# Patient Record
Sex: FEMALE | Race: WHITE | Employment: FULL TIME | URBAN - METROPOLITAN AREA
[De-identification: names, ages, dates, MRNs, and addresses within clinical notes are randomized per-mention and may not be internally consistent; named-entity substitution may affect disease eponyms.]

---

## 2022-07-12 ENCOUNTER — TRANSCRIBE ORDER (OUTPATIENT)
Dept: REGISTRATION | Age: 37
End: 2022-07-12

## 2022-07-12 ENCOUNTER — HOSPITAL ENCOUNTER (OUTPATIENT)
Dept: GENERAL RADIOLOGY | Age: 37
Discharge: HOME | End: 2022-07-12
Attending: PODIATRIST
Payer: OTHER GOVERNMENT

## 2022-07-12 DIAGNOSIS — Q66.89 TARSAL COALITION OF BOTH FEET: ICD-10-CM

## 2022-07-12 DIAGNOSIS — Q66.89 TARSAL COALITION OF BOTH FEET: Primary | ICD-10-CM

## 2022-07-12 PROCEDURE — 73630 X-RAY EXAM OF FOOT: CPT

## 2022-07-25 ENCOUNTER — OFFICE VISIT (OUTPATIENT)
Dept: HEMATOLOGY | Age: 37
End: 2022-07-25
Payer: OTHER GOVERNMENT

## 2022-07-25 ENCOUNTER — HOSPITAL ENCOUNTER (OUTPATIENT)
Dept: LAB | Age: 37
Discharge: HOME | End: 2022-07-25
Payer: OTHER GOVERNMENT

## 2022-07-25 VITALS
TEMPERATURE: 96.9 F | BODY MASS INDEX: 42.17 KG/M2 | WEIGHT: 238 LBS | SYSTOLIC BLOOD PRESSURE: 140 MMHG | DIASTOLIC BLOOD PRESSURE: 91 MMHG | HEART RATE: 84 BPM | OXYGEN SATURATION: 99 % | HEIGHT: 63 IN

## 2022-07-25 DIAGNOSIS — K76.9 CHRONIC LIVER DISEASE: Primary | ICD-10-CM

## 2022-07-25 DIAGNOSIS — K76.9 CHRONIC LIVER DISEASE: ICD-10-CM

## 2022-07-25 PROBLEM — Z98.890 H/O BILATERAL BREAST REDUCTION SURGERY: Status: ACTIVE | Noted: 2022-07-25

## 2022-07-25 PROBLEM — I10 HTN (HYPERTENSION): Status: ACTIVE | Noted: 2022-07-25

## 2022-07-25 PROBLEM — M75.80: Status: ACTIVE | Noted: 2022-07-25

## 2022-07-25 LAB
ALBUMIN SERPL-MCNC: 3.7 G/DL (ref 3.4–5)
ALBUMIN/GLOB SERPL: 0.9 {RATIO} (ref 0.8–1.7)
ALP SERPL-CCNC: 59 U/L (ref 45–117)
ALT SERPL-CCNC: 24 U/L (ref 13–56)
AMMONIA PLAS-SCNC: 30 UMOL/L (ref 11–32)
ANION GAP SERPL CALC-SCNC: 6 MMOL/L (ref 3–18)
AST SERPL-CCNC: 13 U/L (ref 10–38)
BASOPHILS # BLD: 0.1 K/UL (ref 0–0.1)
BASOPHILS NFR BLD: 1 % (ref 0–2)
BILIRUB DIRECT SERPL-MCNC: <0.1 MG/DL (ref 0–0.2)
BILIRUB SERPL-MCNC: 0.2 MG/DL (ref 0.2–1)
BUN SERPL-MCNC: 20 MG/DL (ref 7–18)
BUN/CREAT SERPL: 27 (ref 12–20)
CALCIUM SERPL-MCNC: 9.2 MG/DL (ref 8.5–10.1)
CHLORIDE SERPL-SCNC: 104 MMOL/L (ref 100–111)
CO2 SERPL-SCNC: 27 MMOL/L (ref 21–32)
CREAT SERPL-MCNC: 0.74 MG/DL (ref 0.6–1.3)
DIFFERENTIAL METHOD BLD: ABNORMAL
EOSINOPHIL # BLD: 0.4 K/UL (ref 0–0.4)
EOSINOPHIL NFR BLD: 4 % (ref 0–5)
ERYTHROCYTE [DISTWIDTH] IN BLOOD BY AUTOMATED COUNT: 13.2 % (ref 11.6–14.5)
FERRITIN SERPL-MCNC: 36 NG/ML (ref 8–388)
GLOBULIN SER CALC-MCNC: 4.2 G/DL (ref 2–4)
GLUCOSE SERPL-MCNC: 95 MG/DL (ref 74–99)
HCT VFR BLD AUTO: 40.6 % (ref 35–45)
HGB BLD-MCNC: 13.1 G/DL (ref 12–16)
IMM GRANULOCYTES # BLD AUTO: 0.1 K/UL (ref 0–0.04)
IMM GRANULOCYTES NFR BLD AUTO: 1 % (ref 0–0.5)
INR PPP: 1 (ref 0.8–1.2)
IRON SATN MFR SERPL: 17 % (ref 20–50)
IRON SERPL-MCNC: 69 UG/DL (ref 50–175)
LYMPHOCYTES # BLD: 3 K/UL (ref 0.9–3.6)
LYMPHOCYTES NFR BLD: 32 % (ref 21–52)
MCH RBC QN AUTO: 28.4 PG (ref 24–34)
MCHC RBC AUTO-ENTMCNC: 32.3 G/DL (ref 31–37)
MCV RBC AUTO: 87.9 FL (ref 78–100)
MONOCYTES # BLD: 0.7 K/UL (ref 0.05–1.2)
MONOCYTES NFR BLD: 8 % (ref 3–10)
NEUTS SEG # BLD: 5.1 K/UL (ref 1.8–8)
NEUTS SEG NFR BLD: 54 % (ref 40–73)
NRBC # BLD: 0 K/UL (ref 0–0.01)
NRBC BLD-RTO: 0 PER 100 WBC
PLATELET # BLD AUTO: 311 K/UL (ref 135–420)
PMV BLD AUTO: 9.3 FL (ref 9.2–11.8)
POTASSIUM SERPL-SCNC: 4.3 MMOL/L (ref 3.5–5.5)
PROT SERPL-MCNC: 7.9 G/DL (ref 6.4–8.2)
PROTHROMBIN TIME: 13.3 SEC (ref 11.5–15.2)
RBC # BLD AUTO: 4.62 M/UL (ref 4.2–5.3)
SODIUM SERPL-SCNC: 137 MMOL/L (ref 136–145)
TIBC SERPL-MCNC: 400 UG/DL (ref 250–450)
WBC # BLD AUTO: 9.3 K/UL (ref 4.6–13.2)

## 2022-07-25 PROCEDURE — 86704 HEP B CORE ANTIBODY TOTAL: CPT

## 2022-07-25 PROCEDURE — 83540 ASSAY OF IRON: CPT

## 2022-07-25 PROCEDURE — 86706 HEP B SURFACE ANTIBODY: CPT

## 2022-07-25 PROCEDURE — 86037 ANCA TITER EACH ANTIBODY: CPT

## 2022-07-25 PROCEDURE — 80076 HEPATIC FUNCTION PANEL: CPT

## 2022-07-25 PROCEDURE — 86015 ACTIN ANTIBODY EACH: CPT

## 2022-07-25 PROCEDURE — 82107 ALPHA-FETOPROTEIN L3: CPT

## 2022-07-25 PROCEDURE — 82728 ASSAY OF FERRITIN: CPT

## 2022-07-25 PROCEDURE — 85610 PROTHROMBIN TIME: CPT

## 2022-07-25 PROCEDURE — 36415 COLL VENOUS BLD VENIPUNCTURE: CPT

## 2022-07-25 PROCEDURE — 85025 COMPLETE CBC W/AUTO DIFF WBC: CPT

## 2022-07-25 PROCEDURE — 86803 HEPATITIS C AB TEST: CPT

## 2022-07-25 PROCEDURE — 99204 OFFICE O/P NEW MOD 45 MIN: CPT | Performed by: NURSE PRACTITIONER

## 2022-07-25 PROCEDURE — 80048 BASIC METABOLIC PNL TOTAL CA: CPT

## 2022-07-25 PROCEDURE — 86708 HEPATITIS A ANTIBODY: CPT

## 2022-07-25 PROCEDURE — 82140 ASSAY OF AMMONIA: CPT

## 2022-07-25 PROCEDURE — 87340 HEPATITIS B SURFACE AG IA: CPT

## 2022-07-25 PROCEDURE — 86038 ANTINUCLEAR ANTIBODIES: CPT

## 2022-07-25 RX ORDER — IBUPROFEN 200 MG
200 TABLET ORAL
COMMUNITY

## 2022-07-25 RX ORDER — AMLODIPINE BESYLATE 5 MG/1
5 TABLET ORAL DAILY
COMMUNITY
Start: 2022-01-19 | End: 2022-07-25

## 2022-07-25 RX ORDER — AMLODIPINE AND VALSARTAN 5; 160 MG/1; MG/1
1 TABLET ORAL DAILY
COMMUNITY
Start: 2022-07-12

## 2022-07-25 NOTE — PROGRESS NOTES
Critical access hospital0 [facility], [place], [place], Tawnya Trinoemiliano [place], [place]      Andry Alvarado, MISAEL Ruiz, [facility]     April S Elif, [facility]   LUCY Garcia-CHRISTIANO Julien, [facility]       [place] [facility] [address]    at [address], [address] [facility], [facility] 22.    [phone]    FAX: [address] [place] [address], [address]    [phone]    FAX: [phone]       Patient Care Team:  Philip Domingo NP as PCP - General (Nurse Practitioner)  Larry SUMMER Harper (Podiatry)      Problem List  Date Reviewed: 7/25/2022            Codes Class Noted    AC (acromioclavicular) joint bone spurs, unspecified laterality ICD-10-CM: M75.80  ICD-9-CM: 726.91  7/25/2022        H/O bilateral breast reduction surgery ICD-10-CM: Z98.890  ICD-9-CM: V45.89  7/25/2022        HTN (hypertension) ICD-10-CM: I10  ICD-9-CM: 401.9  7/25/2022             The patient is self-referred to the [facility] [place] due to a family history of liver disease. She reports a history of alcohol use disorder. \"I have bad hangovers\". No medical records were available for review when the patient was here for the appointment. The patient is a 30 y.o.  female. Serologic evaluation for markers of chronic liver disease has either not been performed or the results are not available. Imaging of the liver was either not performed or the results are not available to me. An assessment of liver fibrosis with biopsy, Fibroscan or elastography has not been performed. The patient had not started any new medications within 3 months preceding the elevation in liver chemistries. C/o insomnia.       The patient does not have any symptoms which could be attributed to the liver disorder. The patient completes all daily activities without any functional limitations. The patient has not experienced fatigue, fevers, chills, shortness of breath, chest pain, pain in the right side over the liver, diffuse abdominal pain, nausea, vomiting, constipation, diarrhrea, dry eyes, dry mouth, arthralgias, myalgias, yellowing of the eyes or skin, itching, dark urine, problems concentrating, swelling of the abdomen, swelling of the lower extremities, hematemesis, or hematochezia. ASSESSMENT AND PLAN:  Liver evaluation  The most recent laboratory studies indicate that the liver transaminases are normal, alkaline phosphatase is normal, tests of hepatic synthetic and metabolic function are normal, and the platelet count is normal.      Will perform laboratory testing to monitor liver function and degree of liver injury. Based upon laboratory studies the patient does not appear to have advanced liver disease or cirrhosis. Serologic testing for causes of chronic liver disease was ordered. The most likely causes for the liver chemistry abnormalities were discussed with the patient and include   fatty liver disease, alcoholic liver disease, immune liver disorders. The need to perform an assessment of liver fibrosis was discussed with the patient. The Fibroscan can assess liver fibrosis and determine if a patient has advanced fibrosis or cirrhosis without the need for liver biopsy. This will be performed at the next office visit. If the Fibroscan suggests advanced fibrosis then a liver biopsy should be considered. The Fibroscan can be repeated annually or as often as clinically indicated to assess for fibrosis progression and/or regression. If the Fibroscan suggests there is none or minimal liver injury the patient will be monitored at 6 month intervals.     If the liver enzymes remain normal and the liver stiffness by Fibroscan remains normal or near normal over the next 1-2 years than no further monitoring is needed. If the liver enzymes remain intermittently elevated or become persistently elevated and/or the Fibroscan suggests that liver stiffness is increasing over the next 1-2 years than a liver biopsy should be considered. Will perform imaging of the liver with ultrasound. The need to perform a liver biopsy to help determine the cause and severity of the liver test abnormalities was discussed. The risks of performing the liver biopsy including pain, puncture of the lung, gallbladder, intestine or kidney and bleeding were discussed. There is no reason to perform liver biopsy at this time. Liver chemistries will be monitored. If the liver enzymes remain persistently elevated over the next 1-2 years a liver biopsy should be performed to ensure there is no ongoing chronic liver disease. Screening for Hepatocellular Carcinoma  HCC screening: AFP was ordered today and ultrasound will be scheduled. Treatment of other medical problems in patients with chronic liver disease  There are no contraindications for the patient to take most medications that are necessary for treatment of other medical issues. Normal doses of acetaminophen, as recommended on the label of the bottle, are not hepatotoxic except in the setting of daily alcohol use, even in patients with cirrhosis and can be utilized for pain. The patient consumes alcohol on a daily basis or has recently stopped consuming alcohol. Regular alcohol use increases the risk of toxicity from acetaminophen. This analgesic should be avoided until the patient has been abstinent from alcohol for 6 months. Counseling for alcohol in patients with chronic liver disease  The patient was counseled regarding alcohol consumption and the effect of alcohol on chronic liver disease. The patient has continued to consume alcohol daily. The patient was reminded that alcohol can cause fatty liver.   The patient consumes too much alcohol and is at risk to develop alcohol induced liver injury. It was recommended that all alcohol consumption be stopped and the patient be abstinent from alcohol  for at least 3 months. If the patient cannot stop consuming alcohol then there is an alcohol abuse disorder and the patient should consider entering alcohol counseling and/or attend AA. If the patient cannot stop drinking alcohol they cannot be considered a candidate for liver transplant. Vaccinations   The need for vaccination against viral hepatitis A and B will be assessed with serologic and instituted as appropriate. The patient has received of COVID-19 vaccine with the J&J vaccine. Routine vaccinations against other bacterial and viral agents can be performed as indicated. Annual flu vaccination should be administered if indicated. ALLERGIES  Not on File    MEDICATIONS  Current Outpatient Medications   Medication Sig    amLODIPine-valsartan (EXFORGE) 5-160 mg per tablet Take 1 Tablet by mouth in the morning. ibuprofen (MOTRIN) 200 mg tablet Take 200 mg by mouth every six (6) hours as needed. No current facility-administered medications for this visit. SYSTEM REVIEW NOT RELATED TO LIVER DISEASE OR REVIEWED ABOVE:  Constitution systems: Negative for fever, chills, weight gain, weight loss. Eyes: Negative for visual changes. ENT: Negative for sore throat, painful swallowing. Respiratory: Negative for cough, hemoptysis, SOB. Cardiology: Negative for chest pain, palpitations. GI:  Negative for constipation or diarrhea. : Negative for urinary frequency, dysuria, hematuria, nocturia. Skin: Negative for rash. Hematology: Negative for easy bruising, blood clots. Musculo-skelatal: Negative for back pain, muscle pain, weakness. Neurologic: Negative for headaches, dizziness, vertigo, memory problems not related to HE. Psychology: Negative for anxiety, depression.      FAMILY HISTORY:  The father has the following following chronic disease(s): HTN. He is 62 y/o. The mother has the following chronic disease(s): hypotension, hypothyroidism, alcoholism, breast cancer, drug abuse, bipolar disorder. She is 55 y/o. There is no family history of liver disease. The following family members have liver disease: Maternal great grand father  of cirrhosis at age 44. Alcoholism runs on mother's side. There is no family history of immune disorders. The following family members have immune disorders: cousin on mother side has chron's, mother has hypothyroidism,      SOCIAL HISTORY:  The patient is . The patient has 2 children. The patient has never used tobacco products. The patient consumes 3 alcoholic beverages per day. This is about half what she used to drink. She reduced the intake about 2 or 3 months ago. The patient currently works full time as [employer]. She is on short term disability while she is getting her bone spurs treated. PHYSICAL EXAMINATION:  Visit Vitals  BP (!) 140/91   Pulse 84   Temp 96.9 °F (36.1 °C) (Tympanic)   Ht 5' 3\" (1.6 m)   Wt 238 lb (108 kg)   SpO2 99%   BMI 42.16 kg/m²     General: No acute distress. Eyes: Sclera anicteric. ENT: No oral lesions. Thyroid normal.  Nodes: No adenopathy. Skin: No spider angiomata. No jaundice. No palmar erythema. Respiratory: Lungs clear to auscultation. Cardiovascular: Regular heart rate. No murmurs. No JVD. Abdomen: Soft non-tender. Liver size normal to percussion/palpation. Spleen not palpable. No obvious ascites. Extremities: No edema. No muscle wasting. No gross arthritic changes. Neurologic: Alert and oriented. Cranial nerves grossly intact. No asterixis.     LABORATORY STUDIES:  Liver [place] of [address] Units 2022   WBC 4.6 - 13.2 K/uL 9.3   ANC 1.8 - 8.0 K/UL 5.1   HGB 12.0 - 16.0 g/dL 13.1    - 420 K/uL 311   INR 0.8 - 1.2   1.0   AST 10 - 38 U/L 13   ALT 13 - 56 U/L 24   Alk Phos 45 - 117 U/L 59   Bili, Total 0.2 - 1.0 MG/DL 0.2   Bili, Direct 0.0 - 0.2 MG/DL <0.1   Albumin 3.4 - 5.0 g/dL 3.7   BUN 7.0 - 18 MG/DL 20 (H)   Creat 0.6 - 1.3 MG/DL 0.74   Na 136 - 145 mmol/L 137   K 3.5 - 5.5 mmol/L 4.3   Cl 100 - 111 mmol/L 104   CO2 21 - 32 mmol/L 27   Glucose 74 - 99 mg/dL 95   Ammonia 11 - 32 UMOL/L 30       SEROLOGIES:   Not available or performed. Testing was performed today. LIVER HISTOLOGY:  Not available or performed    ENDOSCOPIC PROCEDURES:  Not available or performed    RADIOLOGY:  Not available or performed    OTHER TESTING:  Not available or performed    FOLLOW-UP:  All of the issues listed above in the Assessment and Plan were discussed with the patient. All questions were answered. The patient expressed a clear understanding of the above. [address] in 4 weeks for Fibroscan and to review all data and determine the treatment plan.     Familia Mayer, FNP-C  L[facility] [place] [facility]  [address], [address] [place] [address]   [phone]

## 2022-07-26 LAB
HAV AB SER QL IA: NEGATIVE
HBV CORE AB SERPL QL IA: NEGATIVE
HBV SURFACE AB SER QL IA: NEGATIVE
HBV SURFACE AB SERPL IA-ACNC: <3.1 MIU/ML
HBV SURFACE AG SER QL: <0.1 INDEX
HBV SURFACE AG SER QL: NEGATIVE
HCV AB S/CO SERPL IA: 0.2 S/CO RATIO (ref 0–0.9)
HCV AB SERPL QL IA: NORMAL
HEP BS AB COMMENT,HBSAC: ABNORMAL

## 2022-07-28 LAB
C-ANCA TITR SER IF: NORMAL TITER
P-ANCA ATYPICAL TITR SER IF: NORMAL TITER
P-ANCA TITR SER IF: NORMAL TITER

## 2022-08-01 LAB
ANA TITR SER IF: NEGATIVE {TITER}
SMA IGG SER-ACNC: 7 UNITS (ref 0–19)

## 2022-08-04 LAB
AFP L3 MFR SERPL: NORMAL % (ref 0–9.9)
AFP SERPL-MCNC: 3.5 NG/ML (ref 0–6.4)

## 2022-08-26 ENCOUNTER — TRANSCRIBE ORDER (OUTPATIENT)
Dept: SCHEDULING | Age: 37
End: 2022-08-26

## 2022-08-26 DIAGNOSIS — M25.572 LEFT ANKLE PAIN: Primary | ICD-10-CM

## 2022-08-30 ENCOUNTER — OFFICE VISIT (OUTPATIENT)
Dept: HEMATOLOGY | Age: 37
End: 2022-08-30
Payer: OTHER GOVERNMENT

## 2022-08-30 VITALS
OXYGEN SATURATION: 99 % | TEMPERATURE: 97.6 F | DIASTOLIC BLOOD PRESSURE: 91 MMHG | SYSTOLIC BLOOD PRESSURE: 133 MMHG | HEART RATE: 97 BPM | WEIGHT: 240 LBS | HEIGHT: 63 IN | BODY MASS INDEX: 42.52 KG/M2

## 2022-08-30 DIAGNOSIS — K76.9 CHRONIC LIVER DISEASE: Primary | ICD-10-CM

## 2022-08-30 PROCEDURE — 99214 OFFICE O/P EST MOD 30 MIN: CPT | Performed by: NURSE PRACTITIONER

## 2022-08-30 PROCEDURE — 91200 LIVER ELASTOGRAPHY: CPT | Performed by: NURSE PRACTITIONER

## 2022-08-30 NOTE — PROGRESS NOTES
[facility], [place], [address], [place], [place]      MISAEL Albright ACNP-TERRANCE Asencio, [facility]   Spero Genta, FNP-C Lu[place]anna Closs, [facility]       [place] [facility] [address]    at [address], [address] [place] [address], [facility] 22.    [phone]    FAX: [address] [place] [address], [address]    [phone]    FAX: [phone]       Patient Care Team:  Adebayo Phillips NP as PCP - General (Nurse Practitioner)  Edilberto Hansen DPM (Podiatry)      Problem List  Date Reviewed: 8/30/2022            Codes Class Noted    AC (acromioclavicular) joint bone spurs, unspecified laterality ICD-10-CM: M75.80  ICD-9-CM: 726.91  7/25/2022        H/O bilateral breast reduction surgery ICD-10-CM: Z98.890  ICD-9-CM: V45.89  7/25/2022        HTN (hypertension) ICD-10-CM: I10  ICD-9-CM: 401.9  7/25/2022             The patient is self-referred to the [facility] [place] due to a family history of liver disease. She is here today for fibroscan assessment of hepatic fibrosis. She reports a history of alcohol use disorder. \"I have bad hangovers\". The patient is a 30 y.o.  female. Serologic evaluation was negative for all causes of chronic liver disease. Imaging of the liver was either not performed or the results are not available to me. An assessment of liver fibrosis with Fibroscan was performed in the office today. Results indicate normal liver stiffness and a fatty liver. The patient had not started any new medications within 3 months preceding the elevation in liver chemistries. C/o insomnia. The patient does not have any symptoms which could be attributed to the liver disorder.     The patient completes all daily activities without any functional limitations. The patient has not experienced fatigue, fevers, chills, shortness of breath, chest pain, pain in the right side over the liver, diffuse abdominal pain, nausea, vomiting, constipation, diarrhrea, dry eyes, dry mouth, arthralgias, myalgias, yellowing of the eyes or skin, itching, dark urine, problems concentrating, swelling of the abdomen, swelling of the lower extremities, hematemesis, or hematochezia. ASSESSMENT AND PLAN:  Liver evaluation  The most recent laboratory studies indicate that the liver transaminases are normal, alkaline phosphatase is normal, tests of hepatic synthetic and metabolic function are normal, and the platelet count is normal.      Based upon laboratory studies and fibroscan analysis, the patient does not appear to have advanced liver disease or cirrhosis. Serologic evaluation was negative for all causes of chronic liver disease. The most likely causes for the liver chemistry abnormalities were discussed with the patient and include   fatty liver disease or alcohol. The need to perform an assessment of liver fibrosis was discussed with the patient. The Fibroscan can assess liver fibrosis and determine if a patient has advanced fibrosis or cirrhosis without the need for liver biopsy. This was performed during this appointment. Results indicate normal liver stiffness and a fatty liver. The Fibroscan can be repeated annually or as often as clinically indicated to assess for fibrosis progression and/or regression. This will be repeated when the patient returns for follow up in one year. If the liver enzymes remain normal and the liver stiffness by Fibroscan remains normal or near normal over the next 1-2 years than no further monitoring is needed.   If the liver enzymes remain intermittently elevated or become persistently elevated and/or the Fibroscan suggests that liver stiffness is increasing over the next 1-2 years than a liver biopsy should be considered. Will perform imaging of the liver with ultrasound. The need to perform a liver biopsy to help determine the cause and severity of the liver test abnormalities was discussed. The risks of performing the liver biopsy including pain, puncture of the lung, gallbladder, intestine or kidney and bleeding were discussed. There is no reason to perform liver biopsy at this time. Liver chemistries will be monitored. If the liver enzymes remain persistently elevated over the next 1-2 years a liver biopsy should be performed to ensure there is no ongoing chronic liver disease. Screening for Hepatocellular Carcinoma  HCC screening: AFP was ordered today and ultrasound will be scheduled. Treatment of other medical problems in patients with chronic liver disease  There are no contraindications for the patient to take most medications that are necessary for treatment of other medical issues. Normal doses of acetaminophen, as recommended on the label of the bottle, are not hepatotoxic except in the setting of daily alcohol use, even in patients with cirrhosis and can be utilized for pain. The patient consumes alcohol on a daily basis or has recently stopped consuming alcohol. Regular alcohol use increases the risk of toxicity from acetaminophen. This analgesic should be avoided until the patient has been abstinent from alcohol for 6 months. Counseling for alcohol in patients with chronic liver disease  The patient was counseled regarding alcohol consumption and the effect of alcohol on chronic liver disease. The patient has continued to consume alcohol daily. The patient was reminded that alcohol can cause fatty liver. The patient consumes too much alcohol and is at risk to develop alcohol induced liver injury. It was recommended that all alcohol consumption be stopped and the patient be abstinent from alcohol  for at least 3 months.   If the patient cannot stop consuming alcohol then there is an alcohol abuse disorder and the patient should consider entering alcohol counseling and/or attend AA. If the patient cannot stop drinking alcohol they cannot be considered a candidate for liver transplant. Vaccinations   Vaccination for viral hepatitis A and B is recommended. The patient does not have serologic evidence of prior exposure or vaccination with immunity. The patient has received of COVID-19 vaccine with the J&J vaccine. Routine vaccinations against other bacterial and viral agents can be performed as indicated. Annual flu vaccination should be administered if indicated. ALLERGIES  Not on File    MEDICATIONS  Current Outpatient Medications   Medication Sig    amLODIPine-valsartan (EXFORGE) 5-160 mg per tablet Take 1 Tablet by mouth in the morning. ibuprofen (MOTRIN) 200 mg tablet Take 200 mg by mouth every six (6) hours as needed. No current facility-administered medications for this visit. SYSTEM REVIEW NOT RELATED TO LIVER DISEASE OR REVIEWED ABOVE:  Constitution systems: Negative for fever, chills, weight gain, weight loss. Eyes: Negative for visual changes. ENT: Negative for sore throat, painful swallowing. Respiratory: Negative for cough, hemoptysis, SOB. Cardiology: Negative for chest pain, palpitations. GI:  Negative for constipation or diarrhea. : Negative for urinary frequency, dysuria, hematuria, nocturia. Skin: Negative for rash. Hematology: Negative for easy bruising, blood clots. Musculo-skelatal: Negative for back pain, muscle pain, weakness. Neurologic: Negative for headaches, dizziness, vertigo, memory problems not related to HE. Psychology: Negative for anxiety, depression. FAMILY HISTORY:  The father has the following following chronic disease(s): HTN. He is 60 y/o.    The mother has the following chronic disease(s): hypotension, hypothyroidism, alcoholism, breast cancer, drug abuse, bipolar disorder. She is 55 y/o. There is no family history of liver disease. The following family members have liver disease: Maternal great grand father  of cirrhosis at age 44. Alcoholism runs on mother's side. There is no family history of immune disorders. The following family members have immune disorders: cousin on mother side has chron's, mother has hypothyroidism,      SOCIAL HISTORY:  The patient is . The patient has 2 children. The patient has never used tobacco products. The patient consumes 3 alcoholic beverages per day. This is about half what she used to drink. She reduced the intake about 2 or 3 months ago. The patient currently works full time as [employer]. She is on short term disability while she is getting her bone spurs treated. PHYSICAL EXAMINATION:  Visit Vitals  BP (!) 133/91   Pulse 97   Temp 97.6 °F (36.4 °C)   Ht 5' 3\" (1.6 m)   Wt 240 lb (108.9 kg)   SpO2 99%   BMI 42.51 kg/m²     General: No acute distress. Eyes: Sclera anicteric. ENT: No oral lesions. Thyroid normal.  Nodes: No adenopathy. Skin: No spider angiomata. No jaundice. No palmar erythema. Respiratory: Lungs clear to auscultation. Cardiovascular: Regular heart rate. No murmurs. No JVD. Abdomen: Soft non-tender. Liver size normal to percussion/palpation. Spleen not palpable. No obvious ascites. Extremities: No edema. No muscle wasting. No gross arthritic changes. Neurologic: Alert and oriented. Cranial nerves grossly intact. No asterixis.     LABORATORY STUDIES:  Liver [place] of [address] Units 2022   WBC 4.6 - 13.2 K/uL 9.3   ANC 1.8 - 8.0 K/UL 5.1   HGB 12.0 - 16.0 g/dL 13.1    - 420 K/uL 311   INR 0.8 - 1.2   1.0   AST 10 - 38 U/L 13   ALT 13 - 56 U/L 24   Alk Phos 45 - 117 U/L 59   Bili, Total 0.2 - 1.0 MG/DL 0.2   Bili, Direct 0.0 - 0.2 MG/DL <0.1   Albumin 3.4 - 5.0 g/dL 3.7   BUN 7.0 - 18 MG/DL 20 (H) Creat 0.6 - 1.3 MG/DL 0.74   Na 136 - 145 mmol/L 137   K 3.5 - 5.5 mmol/L 4.3   Cl 100 - 111 mmol/L 104   CO2 21 - 32 mmol/L 27   Glucose 74 - 99 mg/dL 95   Ammonia 11 - 32 UMOL/L 30     Serologies Latest Ref Rng & Units 7/25/2022   Hep A Ab, Total Negative   Negative   Hep B Surface Ag <1.00 Index <0.10   Hep B Surface Ag Interp NEG   Negative   Hep B Core Ab, Total Negative   Negative   Hep B Surface Ab >10.0 mIU/mL <3.10 (L)   Hep B Surface Ab Interp POS   Negative (A)   Hep C Ab 0.0 - 0.9 s/co ratio 0.2   Ferritin 8 - 388 NG/ML 36   Iron % Saturation 20 - 50 % 17 (L)   MARC, IFA  Negative   C-ANCA Neg:<1:20 titer <1:20   P-ANCA Neg:<1:20 titer <1:20   ANCA Neg:<1:20 titer <1:20   ASMCA 0 - 19 Units 7     LIVER HISTOLOGY:  08/2022. FibroScan performed at [address]. EkPa was 5.4. IQR/med 6%. . The results suggested a fibrosis level of F0. The CAP score suggests there is hepatic steatosis. ENDOSCOPIC PROCEDURES:  Not available or performed    RADIOLOGY:  Not available or performed    OTHER TESTING:  Not available or performed    FOLLOW-UP:  All of the issues listed above in the Assessment and Plan were discussed with the patient. All questions were answered. The patient expressed a clear understanding of the above. [email or web address] in one year for Fibroscan. She will try to lose 25 pounds over that time.       Sherman Swann, FNP-C  Liver [place] of [facility]  [address], [address] [facility], [address] [place] [address]   [phone]

## 2022-10-26 ENCOUNTER — HOSPITAL ENCOUNTER (OUTPATIENT)
Dept: MRI IMAGING | Age: 37
Discharge: HOME | End: 2022-10-26
Attending: PODIATRIST
Payer: OTHER GOVERNMENT

## 2022-10-26 VITALS — WEIGHT: 240 LBS | BODY MASS INDEX: 42.51 KG/M2

## 2022-10-26 DIAGNOSIS — M25.572 LEFT ANKLE PAIN: ICD-10-CM

## 2022-10-26 PROCEDURE — 74011250636 HC RX REV CODE- 250/636: Performed by: PODIATRIST

## 2022-10-26 PROCEDURE — A9577 INJ MULTIHANCE: HCPCS | Performed by: PODIATRIST

## 2022-10-26 PROCEDURE — 73723 MRI JOINT LWR EXTR W/O&W/DYE: CPT

## 2022-10-26 RX ADMIN — GADOBENATE DIMEGLUMINE 20 ML: 529 INJECTION, SOLUTION INTRAVENOUS at 17:08
